# Patient Record
Sex: MALE | Race: WHITE
[De-identification: names, ages, dates, MRNs, and addresses within clinical notes are randomized per-mention and may not be internally consistent; named-entity substitution may affect disease eponyms.]

---

## 2022-10-26 PROBLEM — Z00.00 ENCOUNTER FOR PREVENTIVE HEALTH EXAMINATION: Status: ACTIVE | Noted: 2022-10-26

## 2022-12-30 ENCOUNTER — APPOINTMENT (OUTPATIENT)
Dept: ORTHOPEDIC SURGERY | Facility: CLINIC | Age: 62
End: 2022-12-30
Payer: COMMERCIAL

## 2022-12-30 VITALS — HEIGHT: 75 IN | BODY MASS INDEX: 32.33 KG/M2 | WEIGHT: 260 LBS

## 2022-12-30 DIAGNOSIS — E11.9 TYPE 2 DIABETES MELLITUS W/OUT COMPLICATIONS: ICD-10-CM

## 2022-12-30 DIAGNOSIS — M48.061 SPINAL STENOSIS, LUMBAR REGION WITHOUT NEUROGENIC CLAUDICATION: ICD-10-CM

## 2022-12-30 PROCEDURE — 99204 OFFICE O/P NEW MOD 45 MIN: CPT

## 2022-12-30 PROCEDURE — 73620 X-RAY EXAM OF FOOT: CPT | Mod: 50

## 2022-12-30 PROCEDURE — 73600 X-RAY EXAM OF ANKLE: CPT | Mod: 50

## 2023-01-08 NOTE — DATA REVIEWED
[MRI] : MRI [Lumbar Spine] : lumbar spine [Report was reviewed and noted in the chart] : The report was reviewed and noted in the chart [Ankle] : ankle [Foot] : foot [I independently reviewed and interpreted images and report] : I independently reviewed and interpreted images and report [I reviewed the films/CD and additionally noted] : I reviewed the films/CD and additionally noted [FreeTextEntry2] : In office xrays taken today demonstrate no fracture, minimal arthritis, no evidence of charcot changes [FreeTextEntry1] : I stop paperwork reviewed

## 2023-01-08 NOTE — PHYSICAL EXAM
[Normal Coordination] : normal coordination [Normal DTR UE/LE] : normal DTR UE/LE  [Normal Sensation] : normal sensation [Normal Mood and Affect] : normal mood and affect [Orientated] : orientated [Able to Communicate] : able to communicate [Normal Skin] : normal skin [No Rash] : no rash [No Ulcers] : no ulcers [No Lesions] : no lesions [No obvious lymphadenopathy in areas examined] : no obvious lymphadenopathy in areas examined [Well Developed] : well developed [Well Nourished] : well nourished [Peripheral vascular exam is grossly normal] : peripheral vascular exam is grossly normal [No Respiratory Distress] : no respiratory distress [de-identified] : Constitutional:\par - General Appearance:\par Unremarkable\par Body Habitus\par Well Developed\par Well Nourished\par Body Habitus\par No Deformities\par Well Groomed\par Ability To communicate:\par Normal\par Neurologic:\par Global sensation is intact to upper and lower extremities. See examination of Neck and/or Spine\par for exceptions.\par Orientation to Time, Place and Person is: Normal\par Mood And Affect is Normal\par Skin:\par - Head/Face, Right Upper/Lower Extremity, Left Upper/Lower Extremity: Normal\par See Examination of Neck and/or Spine for exceptions\par Cardiovascular:\par Peripheral Cardiovascular System is Normal\par Palpation of Lymph Nodes:\par Normal Palpation of lymph nodes in: Axilla, Cervical, Inguinal\par Abnormal Palpation of lymph nodes in: None  [] : clonus not sustained at ankle [de-identified] : instability with ambulation  [FreeTextEntry9] : minimal arthritis. \par  [de-identified] : minimal arthritis. \par

## 2023-01-08 NOTE — HISTORY OF PRESENT ILLNESS
[de-identified] : 12/30/2022 - The patient is a 62 year old M who presents today for an initial evaluation of lumbar. 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 Today, his chief complaint is constant pain in the b/l feet, as well as low back pain. Back pain severity ranges from 1-7/10. Difficulty with stairs, and ambulating long distances. He reports tightness/pain in the thighs with ambulating. Instability is also present while ambulating, unsure if he is able to feel the floor beneath him. Patient is diabetic, diagnosed with neuropathy. Formally treated with LESI (no relief), and is currently managing his pains with chiropractic care. He is physically active at the gym and completing home stretching for symptom control. \par Cervical - numbness in the left hand. \par  \par Date of Injury/Onset:  6-7 years\par Pain:    At Rest: 0-7/10  \par With Activity:  9/10  \par Mechanism of injury: no specfic cause of injury\par Quality of symptoms: Tightness & Pressure. Radiating down right leg.\par Improves with: Advil \par Worse with:  Prolonged walking, lifting & stairs\par Prior treatment/ Imaging: Chiropractor in [place], MRI \par School/Sport/Position/Occupation: \par Additional Information: None \par \par

## 2023-01-08 NOTE — DISCUSSION/SUMMARY
[de-identified] : In regards of lumbar spine: \par 61 y/o male with moderate stenosis L2-3, L3-4, L4-5. Discussed possible interventional spine injections vs laminectomy. Discussed and reviewed results of ankle / foot x-ray which revealed minimal arthritis, charcot foot ruled out. \par Referred patient to spine specialist Dr. Joseph Reese ([place], [place]). \par \par In regards of cervical spine:\par I am requesting a cervical spine MRI to evaluate for spinal cord compression, patient is demonstrating neurologic deficits in terms of gait ataxia. \par \par Prior to appointment and during encounter with patient extensive medical records were reviewed including but not limited to, hospital records, outpatient records, imaging results, and lab data.During this appointment the patient was examined, diagnoses were discussed and explained in a face to face manner. In addition extensive time was spent reviewing aforementioned diagnostic studies. Counseling including abnormal image results, differential diagnoses, treatment options, risk and benefits, lifestyle changes, current condition, and current medications was performed. Patient's comments, questions, and concerns were addressed and patient verbalized understanding. Based on this patient's presentation at our office, which is an orthopedic spine surgeon's office, this patient inherently / intrinsically has a risk, however minute, of developing issues such as Cauda equina syndrome, bowel and bladder changes, or progression of motor or neurological deficits such as paralysis which may be permanent.\par \par REINA SHETTY Acting as a Scribe for Dr. Moses\par I, Reina Shetty, attest that this documentation has been prepared under the direction and in the presence of Provider Jame Moses MD.